# Patient Record
Sex: FEMALE | Employment: OTHER | URBAN - NONMETROPOLITAN AREA
[De-identification: names, ages, dates, MRNs, and addresses within clinical notes are randomized per-mention and may not be internally consistent; named-entity substitution may affect disease eponyms.]

---

## 2017-02-06 ENCOUNTER — OFFICE VISIT (OUTPATIENT)
Dept: INTERNAL MEDICINE | Age: 77
End: 2017-02-06

## 2017-02-06 ENCOUNTER — HOSPITAL ENCOUNTER (OUTPATIENT)
Age: 77
Setting detail: SPECIMEN
Discharge: HOME | End: 2017-02-06
Payer: MEDICARE

## 2017-02-06 VITALS
HEART RATE: 82 BPM | WEIGHT: 151.6 LBS | BODY MASS INDEX: 25.23 KG/M2 | SYSTOLIC BLOOD PRESSURE: 126 MMHG | DIASTOLIC BLOOD PRESSURE: 64 MMHG

## 2017-02-06 DIAGNOSIS — N95.2 VAGINAL ATROPHY: ICD-10-CM

## 2017-02-06 DIAGNOSIS — Z92.89 PERSONAL HISTORY OF OTHER MEDICAL TREATMENT: ICD-10-CM

## 2017-02-06 DIAGNOSIS — R87.610 ATYPICAL SQUAMOUS CELLS OF UNDETERMINED SIGNIFICANCE ON CYTOLOGIC SMEAR OF CERVIX (ASC-US): Primary | ICD-10-CM

## 2017-02-06 PROCEDURE — G8400 PT W/DXA NO RESULTS DOC: HCPCS | Performed by: NURSE PRACTITIONER

## 2017-02-06 PROCEDURE — 4040F PNEUMOC VAC/ADMIN/RCVD: CPT | Performed by: NURSE PRACTITIONER

## 2017-02-06 PROCEDURE — G8420 CALC BMI NORM PARAMETERS: HCPCS | Performed by: NURSE PRACTITIONER

## 2017-02-06 PROCEDURE — 1090F PRES/ABSN URINE INCON ASSESS: CPT | Performed by: NURSE PRACTITIONER

## 2017-02-06 PROCEDURE — 99213 OFFICE O/P EST LOW 20 MIN: CPT | Performed by: NURSE PRACTITIONER

## 2017-02-06 PROCEDURE — G8427 DOCREV CUR MEDS BY ELIG CLIN: HCPCS | Performed by: NURSE PRACTITIONER

## 2017-02-06 PROCEDURE — G8484 FLU IMMUNIZE NO ADMIN: HCPCS | Performed by: NURSE PRACTITIONER

## 2017-02-06 PROCEDURE — 1123F ACP DISCUSS/DSCN MKR DOCD: CPT | Performed by: NURSE PRACTITIONER

## 2017-02-06 PROCEDURE — 1036F TOBACCO NON-USER: CPT | Performed by: NURSE PRACTITIONER

## 2017-02-06 RX ORDER — LISINOPRIL 5 MG/1
5 TABLET ORAL
COMMUNITY
Start: 2017-01-23 | End: 2018-10-16 | Stop reason: ALTCHOICE

## 2017-02-17 LAB — CYTOLOGY REPORT: NORMAL

## 2017-02-23 ENCOUNTER — TELEPHONE (OUTPATIENT)
Dept: INTERNAL MEDICINE | Age: 77
End: 2017-02-23

## 2017-02-23 RX ORDER — ESTRADIOL 0.1 MG/G
CREAM VAGINAL
Qty: 1 TUBE | Refills: 3 | Status: SHIPPED | OUTPATIENT
Start: 2017-02-23 | End: 2018-09-11

## 2017-10-11 ENCOUNTER — OFFICE VISIT (OUTPATIENT)
Dept: OBGYN | Age: 77
End: 2017-10-11
Payer: MEDICARE

## 2017-10-11 ENCOUNTER — HOSPITAL ENCOUNTER (OUTPATIENT)
Age: 77
Setting detail: SPECIMEN
Discharge: HOME | End: 2017-10-11
Payer: MEDICARE

## 2017-10-11 VITALS
HEIGHT: 65 IN | BODY MASS INDEX: 25.52 KG/M2 | SYSTOLIC BLOOD PRESSURE: 124 MMHG | HEART RATE: 84 BPM | WEIGHT: 153.2 LBS | DIASTOLIC BLOOD PRESSURE: 80 MMHG

## 2017-10-11 DIAGNOSIS — R87.610 ATYPICAL SQUAMOUS CELLS OF UNDETERMINED SIGNIFICANCE ON CYTOLOGIC SMEAR OF CERVIX (ASC-US): ICD-10-CM

## 2017-10-11 DIAGNOSIS — R87.610 ATYPICAL SQUAMOUS CELLS OF UNDETERMINED SIGNIFICANCE ON CYTOLOGIC SMEAR OF CERVIX (ASC-US): Primary | ICD-10-CM

## 2017-10-11 DIAGNOSIS — Z92.89 PERSONAL HISTORY OF OTHER MEDICAL TREATMENT (CODE): ICD-10-CM

## 2017-10-11 PROCEDURE — G8427 DOCREV CUR MEDS BY ELIG CLIN: HCPCS | Performed by: NURSE PRACTITIONER

## 2017-10-11 PROCEDURE — 1123F ACP DISCUSS/DSCN MKR DOCD: CPT | Performed by: NURSE PRACTITIONER

## 2017-10-11 PROCEDURE — G8419 CALC BMI OUT NRM PARAM NOF/U: HCPCS | Performed by: NURSE PRACTITIONER

## 2017-10-11 PROCEDURE — 1090F PRES/ABSN URINE INCON ASSESS: CPT | Performed by: NURSE PRACTITIONER

## 2017-10-11 PROCEDURE — 1036F TOBACCO NON-USER: CPT | Performed by: NURSE PRACTITIONER

## 2017-10-11 PROCEDURE — 99214 OFFICE O/P EST MOD 30 MIN: CPT | Performed by: NURSE PRACTITIONER

## 2017-10-11 PROCEDURE — G8400 PT W/DXA NO RESULTS DOC: HCPCS | Performed by: NURSE PRACTITIONER

## 2017-10-11 PROCEDURE — G0145 SCR C/V CYTO,THINLAYER,RESCR: HCPCS

## 2017-10-11 PROCEDURE — 4040F PNEUMOC VAC/ADMIN/RCVD: CPT | Performed by: NURSE PRACTITIONER

## 2017-10-11 PROCEDURE — G8484 FLU IMMUNIZE NO ADMIN: HCPCS | Performed by: NURSE PRACTITIONER

## 2017-10-11 RX ORDER — ALBUTEROL SULFATE 90 UG/1
2 AEROSOL, METERED RESPIRATORY (INHALATION)
COMMUNITY
Start: 2017-09-26

## 2017-10-17 LAB — CYTOLOGY REPORT: NORMAL

## 2018-01-31 ENCOUNTER — HOSPITAL ENCOUNTER (OUTPATIENT)
Age: 78
Setting detail: SPECIMEN
Discharge: HOME | End: 2018-01-31
Payer: MEDICARE

## 2018-01-31 ENCOUNTER — OFFICE VISIT (OUTPATIENT)
Dept: OBGYN | Age: 78
End: 2018-01-31
Payer: MEDICARE

## 2018-01-31 VITALS
BODY MASS INDEX: 25.69 KG/M2 | SYSTOLIC BLOOD PRESSURE: 126 MMHG | HEART RATE: 78 BPM | HEIGHT: 65 IN | DIASTOLIC BLOOD PRESSURE: 70 MMHG | WEIGHT: 154.2 LBS

## 2018-01-31 DIAGNOSIS — R21 RASH AND OTHER NONSPECIFIC SKIN ERUPTION: ICD-10-CM

## 2018-01-31 DIAGNOSIS — L90.0 LICHEN SCLEROSUS: ICD-10-CM

## 2018-01-31 DIAGNOSIS — N89.8 VAGINAL ITCHING: Primary | ICD-10-CM

## 2018-01-31 DIAGNOSIS — N89.8 VAGINAL ITCHING: ICD-10-CM

## 2018-01-31 LAB
CLUE CELLS: NEGATIVE
HYPHAL YEAST: NEGATIVE
KOH RESULT: NEGATIVE
TRICHOMONAS: NEGATIVE
WET PREP: NEGATIVE
WHITE BLOOD CELLS: NEGATIVE

## 2018-01-31 PROCEDURE — 1090F PRES/ABSN URINE INCON ASSESS: CPT | Performed by: NURSE PRACTITIONER

## 2018-01-31 PROCEDURE — 87070 CULTURE OTHR SPECIMN AEROBIC: CPT

## 2018-01-31 PROCEDURE — 1036F TOBACCO NON-USER: CPT | Performed by: NURSE PRACTITIONER

## 2018-01-31 PROCEDURE — 87510 GARDNER VAG DNA DIR PROBE: CPT

## 2018-01-31 PROCEDURE — 99213 OFFICE O/P EST LOW 20 MIN: CPT | Performed by: NURSE PRACTITIONER

## 2018-01-31 PROCEDURE — G8484 FLU IMMUNIZE NO ADMIN: HCPCS | Performed by: NURSE PRACTITIONER

## 2018-01-31 PROCEDURE — 87660 TRICHOMONAS VAGIN DIR PROBE: CPT

## 2018-01-31 PROCEDURE — G8400 PT W/DXA NO RESULTS DOC: HCPCS | Performed by: NURSE PRACTITIONER

## 2018-01-31 PROCEDURE — 87480 CANDIDA DNA DIR PROBE: CPT

## 2018-01-31 PROCEDURE — 87210 SMEAR WET MOUNT SALINE/INK: CPT | Performed by: NURSE PRACTITIONER

## 2018-01-31 PROCEDURE — 1123F ACP DISCUSS/DSCN MKR DOCD: CPT | Performed by: NURSE PRACTITIONER

## 2018-01-31 PROCEDURE — 4040F PNEUMOC VAC/ADMIN/RCVD: CPT | Performed by: NURSE PRACTITIONER

## 2018-01-31 PROCEDURE — G8427 DOCREV CUR MEDS BY ELIG CLIN: HCPCS | Performed by: NURSE PRACTITIONER

## 2018-01-31 PROCEDURE — G8419 CALC BMI OUT NRM PARAM NOF/U: HCPCS | Performed by: NURSE PRACTITIONER

## 2018-01-31 RX ORDER — CLOBETASOL PROPIONATE 0.5 MG/G
CREAM TOPICAL
Qty: 60 G | Refills: 2 | Status: SHIPPED | OUTPATIENT
Start: 2018-01-31 | End: 2019-10-21 | Stop reason: ALTCHOICE

## 2018-01-31 NOTE — PATIENT INSTRUCTIONS
you are having a problem with your medicine. · Put cold, wet cloths on the area to reduce itching. · Wear loose-fitting clothes. Avoid nylon and other fabric that holds moisture close to the skin. This may allow an infection to start. · If your doctor told you to use nonprescription moisturizing cream on your skin, read and follow the directions on the label. Care tips for women  · Do not douche, unless your doctor tells you to. · Avoid hot baths. Don't use soaps or bath products to wash the area around your vulva. Rinse with water only, and gently pat the area dry. Care tips for men  · Keep your penis clean. If you haven't been circumcised, gently pull the foreskin back to wash your penis with warm water. Make sure your penis is dry before you get dressed. When should you call for help? Call your doctor now or seek immediate medical care if:  ? · You have symptoms of infection, such as:  ¨ Increased pain, swelling, warmth, or redness. ¨ Red streaks leading from the area. ¨ Pus draining from the area. ¨ A fever. ? Watch closely for changes in your health, and be sure to contact your doctor if:  ? · The affected area grows or changes. ? · You do not get better as expected. Where can you learn more? Go to [email or web address] Mobi. org and sign in to your [employer] account. Enter O765 in the [facility] box to learn more about \"Lichen Sclerosus: Care Instructions. \"     If you do not have an account, please click on the \"Sign Up Now\" link. Current as of: October 13, 2016  Content Version: 11.5  © 0099-6230 [employer]. Care instructions adapted under license by [facility] ([facility]). If you have questions about a medical condition or this instruction, always ask your healthcare professional. [address] any warranty or liability for your use of this information.

## 2018-01-31 NOTE — PROGRESS NOTES
license by [facility] ([facility]). If you have questions about a medical condition or this instruction, always ask your healthcare professional. [place] [address] any warranty or liability for your use of this information.            (Please note that portions of this note were completed with a voice-recognition program. Efforts were made to edit the dictations but occasionally words are mis-transcribed.)

## 2018-02-01 LAB
DIRECT EXAM: NORMAL
Lab: NORMAL
SPECIMEN DESCRIPTION: NORMAL
SPECIMEN DESCRIPTION: NORMAL
STATUS: NORMAL

## 2018-02-04 LAB
CULTURE: NORMAL
Lab: NORMAL
SPECIMEN DESCRIPTION: NORMAL
SPECIMEN DESCRIPTION: NORMAL
STATUS: NORMAL

## 2018-08-29 ENCOUNTER — HOSPITAL ENCOUNTER (OUTPATIENT)
Age: 78
Setting detail: SPECIMEN
Discharge: HOME | End: 2018-08-29
Payer: MEDICARE

## 2018-08-29 ENCOUNTER — OFFICE VISIT (OUTPATIENT)
Dept: OBGYN | Age: 78
End: 2018-08-29
Payer: MEDICARE

## 2018-08-29 VITALS
RESPIRATION RATE: 16 BRPM | WEIGHT: 155 LBS | DIASTOLIC BLOOD PRESSURE: 60 MMHG | HEIGHT: 65 IN | HEART RATE: 60 BPM | SYSTOLIC BLOOD PRESSURE: 124 MMHG | BODY MASS INDEX: 25.83 KG/M2

## 2018-08-29 DIAGNOSIS — B37.31 MONILIAL VULVOVAGINITIS: ICD-10-CM

## 2018-08-29 DIAGNOSIS — N89.8 VAGINAL ODOR: ICD-10-CM

## 2018-08-29 DIAGNOSIS — N89.8 VAGINAL ITCHING: ICD-10-CM

## 2018-08-29 DIAGNOSIS — N89.8 VAGINAL ITCHING: Primary | ICD-10-CM

## 2018-08-29 LAB
CLUE CELLS: NEGATIVE
DIRECT EXAM: NORMAL
HYPHAL YEAST: PRESENT
KOH RESULT: NEGATIVE
Lab: NORMAL
SPECIMEN DESCRIPTION: NORMAL
STATUS: NORMAL
TRICHOMONAS: NEGATIVE
WET PREP: ABNORMAL
WHITE BLOOD CELLS: NEGATIVE

## 2018-08-29 PROCEDURE — 87660 TRICHOMONAS VAGIN DIR PROBE: CPT

## 2018-08-29 PROCEDURE — G8427 DOCREV CUR MEDS BY ELIG CLIN: HCPCS | Performed by: NURSE PRACTITIONER

## 2018-08-29 PROCEDURE — 87210 SMEAR WET MOUNT SALINE/INK: CPT | Performed by: NURSE PRACTITIONER

## 2018-08-29 PROCEDURE — 87510 GARDNER VAG DNA DIR PROBE: CPT

## 2018-08-29 PROCEDURE — 99213 OFFICE O/P EST LOW 20 MIN: CPT | Performed by: NURSE PRACTITIONER

## 2018-08-29 PROCEDURE — G8400 PT W/DXA NO RESULTS DOC: HCPCS | Performed by: NURSE PRACTITIONER

## 2018-08-29 PROCEDURE — 1036F TOBACCO NON-USER: CPT | Performed by: NURSE PRACTITIONER

## 2018-08-29 PROCEDURE — 1123F ACP DISCUSS/DSCN MKR DOCD: CPT | Performed by: NURSE PRACTITIONER

## 2018-08-29 PROCEDURE — 1101F PT FALLS ASSESS-DOCD LE1/YR: CPT | Performed by: NURSE PRACTITIONER

## 2018-08-29 PROCEDURE — 87480 CANDIDA DNA DIR PROBE: CPT

## 2018-08-29 PROCEDURE — G8419 CALC BMI OUT NRM PARAM NOF/U: HCPCS | Performed by: NURSE PRACTITIONER

## 2018-08-29 PROCEDURE — 1090F PRES/ABSN URINE INCON ASSESS: CPT | Performed by: NURSE PRACTITIONER

## 2018-08-29 PROCEDURE — 4040F PNEUMOC VAC/ADMIN/RCVD: CPT | Performed by: NURSE PRACTITIONER

## 2018-08-29 PROCEDURE — 87070 CULTURE OTHR SPECIMN AEROBIC: CPT

## 2018-08-29 RX ORDER — ASPIRIN 81 MG/1
81 TABLET, CHEWABLE ORAL DAILY
COMMUNITY

## 2018-08-29 RX ORDER — CLOTRIMAZOLE AND BETAMETHASONE DIPROPIONATE 10; .64 MG/G; MG/G
CREAM TOPICAL
Qty: 45 G | Refills: 0 | Status: SHIPPED | OUTPATIENT
Start: 2018-08-29 | End: 2019-01-07 | Stop reason: ALTCHOICE

## 2018-08-29 NOTE — PROGRESS NOTES
Subjective:  Sugey Vincent presents for vaginal itching and odor. 2 months ago noted odor, and itching of external genitalia. Used clobetasol externally and symptoms improved, but a few days ago noted itching inside vagina. Also has rash and irritation around rectum extending up into gluteal cleft. 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 Medication list reviewed and updated. See nursing note. History of present illness reviewed in detail and expanded by me. REVIEW OF SYSTEMS:     A minimum of an eleven point review of systems was completed. Review Of Systems (11 point):  General ROS:  negative  Hematological and Lymphatic ROS:negative   Breast ROS: negative  Cardiovascular ROS: negative  Respiratory ROS: negative   Gastrointestinal ROS: negative  Genito-Urinary ROS: positive for vulvar/vaginal itching and irritation  Psychological ROS: negative  Neurological ROS: negative  Musculoskeletal ROS: negative  Dermatological ROS: negative                                                                                                                                          Objective:  Vitals:    08/29/18 1328   BP: 124/60   Pulse: 60   Resp: 16     Alert and oriented. Abdomen: Soft, non-tender, no masses. No CVA tenderness  External Genetalia: Erythema and monilial rash on labia, perineum, perianal area, and extending to gluteal cleft  Vagina: Atrophic mucosa. No unusual discharge  Cervix and uterus surgically absent.   Bimanual exam revealed no masses or tender ness    Results for orders placed or performed in visit on 08/29/18   POCT Wet Prep   Result Value Ref Range    Wet Prep Abnormal     Hyphal Yeast Present     White Blood Cells Negative     Trichomonas, UA Negative Clue Cells, Wet Prep Negative     KOH result Negative        Assessment:  Encounter Diagnoses   Name Primary?  Vaginal itching Yes    Vaginal odor     Monilial vulvovaginitis        Plan:  See orders. Orders Placed This Encounter   Procedures    Genital Culture     Standing Status:   Future     Standing Expiration Date:   8/29/2019    VAGINITIS DNA PROBE     Standing Status:   Future     Standing Expiration Date:   8/29/2019    POCT Wet Prep     New Prescriptions    CLOTRIMAZOLE-BETAMETHASONE (LOTRISONE) 1-0.05 % CREAM    Apply a thin film to the affected area 2 times daily. TERCONAZOLE (TERAZOL 7) 0.4 % VAGINAL CREAM    Place 1 applicator vaginally nightly for 7 days Place vaginally nightly.      (Please note that portions of this note were completed with a voice-recognition program. Efforts were made to edit the dictations but occasionally words are mis-transcribed.)

## 2018-09-01 LAB
CULTURE: NORMAL
Lab: NORMAL
SPECIMEN DESCRIPTION: NORMAL
STATUS: NORMAL

## 2018-09-07 ENCOUNTER — TELEPHONE (OUTPATIENT)
Dept: OBGYN | Age: 78
End: 2018-09-07

## 2018-09-11 ENCOUNTER — OFFICE VISIT (OUTPATIENT)
Dept: OBGYN | Age: 78
End: 2018-09-11
Payer: MEDICARE

## 2018-09-11 VITALS
BODY MASS INDEX: 25.99 KG/M2 | HEIGHT: 65 IN | WEIGHT: 156 LBS | RESPIRATION RATE: 16 BRPM | DIASTOLIC BLOOD PRESSURE: 72 MMHG | HEART RATE: 72 BPM | SYSTOLIC BLOOD PRESSURE: 124 MMHG

## 2018-09-11 DIAGNOSIS — N95.2 VAGINAL ATROPHY: ICD-10-CM

## 2018-09-11 DIAGNOSIS — N76.0 BV (BACTERIAL VAGINOSIS): ICD-10-CM

## 2018-09-11 DIAGNOSIS — N89.8 VAGINAL ITCHING: Primary | ICD-10-CM

## 2018-09-11 DIAGNOSIS — B96.89 BV (BACTERIAL VAGINOSIS): ICD-10-CM

## 2018-09-11 LAB
CLUE CELLS: ABNORMAL
HYPHAL YEAST: NEGATIVE
KOH RESULT: NEGATIVE
TRICHOMONAS: NEGATIVE
WET PREP: ABNORMAL
WHITE BLOOD CELLS: NEGATIVE

## 2018-09-11 PROCEDURE — 99213 OFFICE O/P EST LOW 20 MIN: CPT | Performed by: NURSE PRACTITIONER

## 2018-09-11 PROCEDURE — 87210 SMEAR WET MOUNT SALINE/INK: CPT | Performed by: NURSE PRACTITIONER

## 2018-09-11 PROCEDURE — 1123F ACP DISCUSS/DSCN MKR DOCD: CPT | Performed by: NURSE PRACTITIONER

## 2018-09-11 PROCEDURE — 1090F PRES/ABSN URINE INCON ASSESS: CPT | Performed by: NURSE PRACTITIONER

## 2018-09-11 PROCEDURE — G8400 PT W/DXA NO RESULTS DOC: HCPCS | Performed by: NURSE PRACTITIONER

## 2018-09-11 PROCEDURE — 1036F TOBACCO NON-USER: CPT | Performed by: NURSE PRACTITIONER

## 2018-09-11 PROCEDURE — G8419 CALC BMI OUT NRM PARAM NOF/U: HCPCS | Performed by: NURSE PRACTITIONER

## 2018-09-11 PROCEDURE — 4040F PNEUMOC VAC/ADMIN/RCVD: CPT | Performed by: NURSE PRACTITIONER

## 2018-09-11 PROCEDURE — G8427 DOCREV CUR MEDS BY ELIG CLIN: HCPCS | Performed by: NURSE PRACTITIONER

## 2018-09-11 PROCEDURE — 1101F PT FALLS ASSESS-DOCD LE1/YR: CPT | Performed by: NURSE PRACTITIONER

## 2018-09-11 RX ORDER — ESTRADIOL 0.1 MG/G
CREAM VAGINAL
Qty: 1 TUBE | Refills: 3 | Status: SHIPPED | OUTPATIENT
Start: 2018-09-11 | End: 2022-04-20

## 2018-09-11 RX ORDER — CLINDAMYCIN PHOSPHATE 20 MG/G
CREAM VAGINAL
Qty: 1 TUBE | Refills: 0 | Status: SHIPPED | OUTPATIENT
Start: 2018-09-11 | End: 2019-10-21 | Stop reason: ALTCHOICE

## 2018-09-11 NOTE — PROGRESS NOTES
Subjective:  David Shields presents for ongoing vaginal itching. Treated for monilial vulvovaginitis Aug. 29.  Symptoms worsened last week, and now seem a bit better, but still has internal vaginal itching. 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 Medication list reviewed and updated. See nursing note. History of present illness reviewed in detail and expanded by me. REVIEW OF SYSTEMS:     A minimum of an eleven point review of systems was completed. Review Of Systems (11 point):  General ROS:  negative  Hematological and Lymphatic ROS:negative   Breast ROS: negative  Cardiovascular ROS: negative  Respiratory ROS: negative   Gastrointestinal ROS: negative  Genito-Urinary ROS: positive for vaginal itching  Psychological ROS: negative  Neurological ROS: negative  Musculoskeletal ROS: negative  Dermatological ROS: negative                                                                                                                                          Objective:  Vitals:    09/11/18 1315   BP: 124/72   Pulse: 72   Resp: 16     Alert and oriented. Abdomen: Soft, non-tender, no masses. No CVA tenderness  External Genetalia: Fissures of skin at folds of upper labia. Mild erythema at introitus  Vagina: Atrophic mucosa. No unusual discharge  Cervix: Normal appearance.  No lesions noted  Uterus: Normal size, mobile, no CMT, non-tender  Adnexae: Non-palpable, non-tender, no masses    Results for orders placed or performed in visit on 09/11/18   POCT Wet Prep   Result Value Ref Range    Wet Prep Abnormal     Hyphal Yeast Negative     White Blood Cells Negative     Trichomonas, UA Negative     Clue Cells, Wet Prep Greater than 50% of squamous cells     KOH result Negative

## 2018-10-16 ENCOUNTER — OFFICE VISIT (OUTPATIENT)
Dept: OBGYN | Age: 78
End: 2018-10-16
Payer: MEDICARE

## 2018-10-16 VITALS
TEMPERATURE: 97.7 F | WEIGHT: 156 LBS | BODY MASS INDEX: 25.99 KG/M2 | SYSTOLIC BLOOD PRESSURE: 128 MMHG | HEART RATE: 58 BPM | DIASTOLIC BLOOD PRESSURE: 80 MMHG | HEIGHT: 65 IN

## 2018-10-16 DIAGNOSIS — N76.3 SUBACUTE VULVITIS: Primary | ICD-10-CM

## 2018-10-16 DIAGNOSIS — N95.2 VAGINAL ATROPHY: ICD-10-CM

## 2018-10-16 PROCEDURE — 1101F PT FALLS ASSESS-DOCD LE1/YR: CPT | Performed by: NURSE PRACTITIONER

## 2018-10-16 PROCEDURE — 1036F TOBACCO NON-USER: CPT | Performed by: NURSE PRACTITIONER

## 2018-10-16 PROCEDURE — 1090F PRES/ABSN URINE INCON ASSESS: CPT | Performed by: NURSE PRACTITIONER

## 2018-10-16 PROCEDURE — 1123F ACP DISCUSS/DSCN MKR DOCD: CPT | Performed by: NURSE PRACTITIONER

## 2018-10-16 PROCEDURE — G8484 FLU IMMUNIZE NO ADMIN: HCPCS | Performed by: NURSE PRACTITIONER

## 2018-10-16 PROCEDURE — G8400 PT W/DXA NO RESULTS DOC: HCPCS | Performed by: NURSE PRACTITIONER

## 2018-10-16 PROCEDURE — G8419 CALC BMI OUT NRM PARAM NOF/U: HCPCS | Performed by: NURSE PRACTITIONER

## 2018-10-16 PROCEDURE — 99214 OFFICE O/P EST MOD 30 MIN: CPT | Performed by: NURSE PRACTITIONER

## 2018-10-16 PROCEDURE — G8427 DOCREV CUR MEDS BY ELIG CLIN: HCPCS | Performed by: NURSE PRACTITIONER

## 2018-10-16 PROCEDURE — 4040F PNEUMOC VAC/ADMIN/RCVD: CPT | Performed by: NURSE PRACTITIONER

## 2019-01-07 ENCOUNTER — OFFICE VISIT (OUTPATIENT)
Dept: OTOLARYNGOLOGY | Age: 79
End: 2019-01-07
Payer: MEDICARE

## 2019-01-07 VITALS
DIASTOLIC BLOOD PRESSURE: 70 MMHG | HEIGHT: 65 IN | WEIGHT: 156 LBS | HEART RATE: 74 BPM | BODY MASS INDEX: 25.99 KG/M2 | SYSTOLIC BLOOD PRESSURE: 102 MMHG | RESPIRATION RATE: 14 BRPM

## 2019-01-07 DIAGNOSIS — H60.63 CHRONIC OTITIS EXTERNA OF BOTH EARS, UNSPECIFIED TYPE: Primary | ICD-10-CM

## 2019-01-07 PROCEDURE — G8427 DOCREV CUR MEDS BY ELIG CLIN: HCPCS | Performed by: OTOLARYNGOLOGY

## 2019-01-07 PROCEDURE — 1090F PRES/ABSN URINE INCON ASSESS: CPT | Performed by: OTOLARYNGOLOGY

## 2019-01-07 PROCEDURE — 99203 OFFICE O/P NEW LOW 30 MIN: CPT | Performed by: OTOLARYNGOLOGY

## 2019-01-07 PROCEDURE — 1036F TOBACCO NON-USER: CPT | Performed by: OTOLARYNGOLOGY

## 2019-01-07 PROCEDURE — 1123F ACP DISCUSS/DSCN MKR DOCD: CPT | Performed by: OTOLARYNGOLOGY

## 2019-01-07 PROCEDURE — G8484 FLU IMMUNIZE NO ADMIN: HCPCS | Performed by: OTOLARYNGOLOGY

## 2019-01-07 PROCEDURE — G8419 CALC BMI OUT NRM PARAM NOF/U: HCPCS | Performed by: OTOLARYNGOLOGY

## 2019-01-07 PROCEDURE — 4040F PNEUMOC VAC/ADMIN/RCVD: CPT | Performed by: OTOLARYNGOLOGY

## 2019-01-07 PROCEDURE — 1101F PT FALLS ASSESS-DOCD LE1/YR: CPT | Performed by: OTOLARYNGOLOGY

## 2019-01-07 PROCEDURE — G8400 PT W/DXA NO RESULTS DOC: HCPCS | Performed by: OTOLARYNGOLOGY

## 2019-01-07 RX ORDER — CIPROFLOXACIN AND DEXAMETHASONE 3; 1 MG/ML; MG/ML
4 SUSPENSION/ DROPS AURICULAR (OTIC) DAILY
Qty: 7.5 ML | Refills: 1 | Status: SHIPPED | OUTPATIENT
Start: 2019-01-07 | End: 2019-01-14

## 2019-02-11 ENCOUNTER — OFFICE VISIT (OUTPATIENT)
Dept: OBGYN | Age: 79
End: 2019-02-11
Payer: MEDICARE

## 2019-02-11 ENCOUNTER — HOSPITAL ENCOUNTER (OUTPATIENT)
Age: 79
Setting detail: SPECIMEN
Discharge: HOME | End: 2019-02-11
Payer: MEDICARE

## 2019-02-11 ENCOUNTER — OFFICE VISIT (OUTPATIENT)
Dept: OTOLARYNGOLOGY | Age: 79
End: 2019-02-11
Payer: MEDICARE

## 2019-02-11 VITALS
HEIGHT: 65 IN | SYSTOLIC BLOOD PRESSURE: 136 MMHG | WEIGHT: 155 LBS | BODY MASS INDEX: 25.83 KG/M2 | RESPIRATION RATE: 18 BRPM | HEART RATE: 72 BPM | DIASTOLIC BLOOD PRESSURE: 84 MMHG

## 2019-02-11 VITALS — HEART RATE: 72 BPM | WEIGHT: 156 LBS | BODY MASS INDEX: 25.99 KG/M2 | HEIGHT: 65 IN

## 2019-02-11 DIAGNOSIS — N89.8 VAGINAL ITCHING: Primary | ICD-10-CM

## 2019-02-11 DIAGNOSIS — N75.0 CYST OF LEFT BARTHOLIN'S GLAND: ICD-10-CM

## 2019-02-11 DIAGNOSIS — B37.31 MONILIAL VULVOVAGINITIS: ICD-10-CM

## 2019-02-11 DIAGNOSIS — H60.63 CHRONIC OTITIS EXTERNA OF BOTH EARS, UNSPECIFIED TYPE: Primary | ICD-10-CM

## 2019-02-11 DIAGNOSIS — R21 RASH AND OTHER NONSPECIFIC SKIN ERUPTION: ICD-10-CM

## 2019-02-11 DIAGNOSIS — N89.8 VAGINAL ITCHING: ICD-10-CM

## 2019-02-11 PROCEDURE — 1036F TOBACCO NON-USER: CPT | Performed by: OTOLARYNGOLOGY

## 2019-02-11 PROCEDURE — 99202 OFFICE O/P NEW SF 15 MIN: CPT | Performed by: OBSTETRICS & GYNECOLOGY

## 2019-02-11 PROCEDURE — 87070 CULTURE OTHR SPECIMN AEROBIC: CPT

## 2019-02-11 PROCEDURE — 1101F PT FALLS ASSESS-DOCD LE1/YR: CPT | Performed by: OTOLARYNGOLOGY

## 2019-02-11 PROCEDURE — G8419 CALC BMI OUT NRM PARAM NOF/U: HCPCS | Performed by: OTOLARYNGOLOGY

## 2019-02-11 PROCEDURE — 1090F PRES/ABSN URINE INCON ASSESS: CPT | Performed by: OTOLARYNGOLOGY

## 2019-02-11 PROCEDURE — G8400 PT W/DXA NO RESULTS DOC: HCPCS | Performed by: OTOLARYNGOLOGY

## 2019-02-11 PROCEDURE — 1123F ACP DISCUSS/DSCN MKR DOCD: CPT | Performed by: OTOLARYNGOLOGY

## 2019-02-11 PROCEDURE — G8484 FLU IMMUNIZE NO ADMIN: HCPCS | Performed by: OTOLARYNGOLOGY

## 2019-02-11 PROCEDURE — G8427 DOCREV CUR MEDS BY ELIG CLIN: HCPCS | Performed by: OTOLARYNGOLOGY

## 2019-02-11 PROCEDURE — 99213 OFFICE O/P EST LOW 20 MIN: CPT | Performed by: OTOLARYNGOLOGY

## 2019-02-11 PROCEDURE — 4040F PNEUMOC VAC/ADMIN/RCVD: CPT | Performed by: OTOLARYNGOLOGY

## 2019-02-11 RX ORDER — CIPROFLOXACIN 500 MG/1
TABLET, FILM COATED ORAL
Refills: 0 | COMMUNITY
Start: 2019-02-07 | End: 2019-10-21 | Stop reason: ALTCHOICE

## 2019-02-11 RX ORDER — CLOTRIMAZOLE AND BETAMETHASONE DIPROPIONATE 10; .64 MG/G; MG/G
CREAM TOPICAL
Qty: 1 TUBE | Refills: 0 | Status: SHIPPED | OUTPATIENT
Start: 2019-02-11 | End: 2019-02-18

## 2019-02-14 LAB
CULTURE: ABNORMAL
Lab: ABNORMAL
SPECIMEN DESCRIPTION: ABNORMAL

## 2019-02-25 ENCOUNTER — HOSPITAL ENCOUNTER (OUTPATIENT)
Age: 79
Setting detail: SPECIMEN
Discharge: HOME | End: 2019-02-25
Payer: MEDICARE

## 2019-02-25 ENCOUNTER — OFFICE VISIT (OUTPATIENT)
Dept: OBGYN | Age: 79
End: 2019-02-25
Payer: MEDICARE

## 2019-02-25 VITALS
HEART RATE: 78 BPM | HEIGHT: 65 IN | RESPIRATION RATE: 18 BRPM | DIASTOLIC BLOOD PRESSURE: 96 MMHG | SYSTOLIC BLOOD PRESSURE: 148 MMHG | WEIGHT: 154 LBS | BODY MASS INDEX: 25.66 KG/M2

## 2019-02-25 DIAGNOSIS — R21 RASH AND OTHER NONSPECIFIC SKIN ERUPTION: ICD-10-CM

## 2019-02-25 DIAGNOSIS — B37.31 MONILIAL VULVOVAGINITIS: Primary | ICD-10-CM

## 2019-02-25 DIAGNOSIS — N95.2 VAGINAL ATROPHY: ICD-10-CM

## 2019-02-25 PROCEDURE — 88304 TISSUE EXAM BY PATHOLOGIST: CPT

## 2019-02-25 PROCEDURE — 99214 OFFICE O/P EST MOD 30 MIN: CPT | Performed by: OBSTETRICS & GYNECOLOGY

## 2019-02-25 PROCEDURE — 56605 BIOPSY OF VULVA/PERINEUM: CPT | Performed by: OBSTETRICS & GYNECOLOGY

## 2019-02-25 PROCEDURE — 88312 SPECIAL STAINS GROUP 1: CPT

## 2019-02-27 LAB — SURGICAL PATHOLOGY REPORT: NORMAL

## 2019-06-03 ENCOUNTER — TELEPHONE (OUTPATIENT)
Dept: OTOLARYNGOLOGY | Age: 79
End: 2019-06-03

## 2019-06-03 RX ORDER — CIPROFLOXACIN AND DEXAMETHASONE 3; 1 MG/ML; MG/ML
4 SUSPENSION/ DROPS AURICULAR (OTIC) 2 TIMES DAILY
Qty: 7.5 ML | Refills: 1 | Status: SHIPPED | OUTPATIENT
Start: 2019-06-03 | End: 2019-06-10

## 2019-06-03 NOTE — TELEPHONE ENCOUNTER
Patient notified Dr. Brooke Keller sent prescription to [facility]. Patient verbalized understanding and had no further questions.

## 2019-06-03 NOTE — TELEPHONE ENCOUNTER
Patient saw Dr Colton Alvarado in Jan and Feb 2019 and was given Ciprodex otic suspension. Patient states her ear is starting to itch, c/o dryness and irritation. Patient wants to know if Dr Colton Alvarado will call in another Rx for this or does he need to see her again.   Please call her back at [phone]

## 2019-10-21 ENCOUNTER — OFFICE VISIT (OUTPATIENT)
Dept: OBGYN | Age: 79
End: 2019-10-21
Payer: MEDICARE

## 2019-10-21 ENCOUNTER — HOSPITAL ENCOUNTER (OUTPATIENT)
Age: 79
Setting detail: SPECIMEN
Discharge: HOME | End: 2019-10-21
Payer: MEDICARE

## 2019-10-21 VITALS
HEIGHT: 65 IN | DIASTOLIC BLOOD PRESSURE: 62 MMHG | TEMPERATURE: 97.8 F | SYSTOLIC BLOOD PRESSURE: 136 MMHG | HEART RATE: 64 BPM | WEIGHT: 149 LBS | BODY MASS INDEX: 24.83 KG/M2

## 2019-10-21 DIAGNOSIS — R39.89 SENSATION OF PRESSURE IN BLADDER AREA: ICD-10-CM

## 2019-10-21 DIAGNOSIS — Z01.419 WELL FEMALE EXAM WITH ROUTINE GYNECOLOGICAL EXAM: ICD-10-CM

## 2019-10-21 DIAGNOSIS — Z12.31 OTHER SCREENING MAMMOGRAM: ICD-10-CM

## 2019-10-21 DIAGNOSIS — Z12.4 CERVICAL CANCER SCREENING: ICD-10-CM

## 2019-10-21 DIAGNOSIS — Z12.4 CERVICAL CANCER SCREENING: Primary | ICD-10-CM

## 2019-10-21 PROCEDURE — 99214 OFFICE O/P EST MOD 30 MIN: CPT | Performed by: NURSE PRACTITIONER

## 2019-10-21 PROCEDURE — G0145 SCR C/V CYTO,THINLAYER,RESCR: HCPCS

## 2019-10-21 PROCEDURE — 1090F PRES/ABSN URINE INCON ASSESS: CPT | Performed by: NURSE PRACTITIONER

## 2019-10-21 PROCEDURE — 1123F ACP DISCUSS/DSCN MKR DOCD: CPT | Performed by: NURSE PRACTITIONER

## 2019-10-21 PROCEDURE — 1036F TOBACCO NON-USER: CPT | Performed by: NURSE PRACTITIONER

## 2019-10-21 PROCEDURE — G8484 FLU IMMUNIZE NO ADMIN: HCPCS | Performed by: NURSE PRACTITIONER

## 2019-10-21 PROCEDURE — G0101 CA SCREEN;PELVIC/BREAST EXAM: HCPCS | Performed by: NURSE PRACTITIONER

## 2019-10-21 PROCEDURE — G8420 CALC BMI NORM PARAMETERS: HCPCS | Performed by: NURSE PRACTITIONER

## 2019-10-21 PROCEDURE — G8427 DOCREV CUR MEDS BY ELIG CLIN: HCPCS | Performed by: NURSE PRACTITIONER

## 2019-10-21 PROCEDURE — 4040F PNEUMOC VAC/ADMIN/RCVD: CPT | Performed by: NURSE PRACTITIONER

## 2019-10-21 PROCEDURE — G8400 PT W/DXA NO RESULTS DOC: HCPCS | Performed by: NURSE PRACTITIONER

## 2019-10-21 RX ORDER — AMLODIPINE BESYLATE 5 MG/1
TABLET ORAL
Refills: 3 | COMMUNITY
Start: 2019-09-19

## 2019-10-24 LAB — CYTOLOGY REPORT: NORMAL

## 2020-02-18 ENCOUNTER — OFFICE VISIT (OUTPATIENT)
Dept: OBGYN | Age: 80
End: 2020-02-18
Payer: MEDICARE

## 2020-02-18 ENCOUNTER — HOSPITAL ENCOUNTER (OUTPATIENT)
Age: 80
Setting detail: SPECIMEN
Discharge: HOME | End: 2020-02-18
Payer: MEDICARE

## 2020-02-18 VITALS
HEIGHT: 64 IN | SYSTOLIC BLOOD PRESSURE: 124 MMHG | BODY MASS INDEX: 25.78 KG/M2 | DIASTOLIC BLOOD PRESSURE: 62 MMHG | WEIGHT: 151 LBS | HEART RATE: 79 BPM | TEMPERATURE: 97.5 F

## 2020-02-18 LAB
CLUE CELLS: NEGATIVE
DIRECT EXAM: NORMAL
HYPHAL YEAST: ABNORMAL
KOH RESULT: NEGATIVE
Lab: NORMAL
SPECIMEN DESCRIPTION: NORMAL
TRICHOMONAS: NEGATIVE
WET PREP: ABNORMAL
WHITE BLOOD CELLS: ABNORMAL

## 2020-02-18 PROCEDURE — 1123F ACP DISCUSS/DSCN MKR DOCD: CPT | Performed by: NURSE PRACTITIONER

## 2020-02-18 PROCEDURE — G8427 DOCREV CUR MEDS BY ELIG CLIN: HCPCS | Performed by: NURSE PRACTITIONER

## 2020-02-18 PROCEDURE — 87480 CANDIDA DNA DIR PROBE: CPT

## 2020-02-18 PROCEDURE — 99213 OFFICE O/P EST LOW 20 MIN: CPT | Performed by: NURSE PRACTITIONER

## 2020-02-18 PROCEDURE — G8484 FLU IMMUNIZE NO ADMIN: HCPCS | Performed by: NURSE PRACTITIONER

## 2020-02-18 PROCEDURE — 87210 SMEAR WET MOUNT SALINE/INK: CPT | Performed by: NURSE PRACTITIONER

## 2020-02-18 PROCEDURE — G8417 CALC BMI ABV UP PARAM F/U: HCPCS | Performed by: NURSE PRACTITIONER

## 2020-02-18 PROCEDURE — 87660 TRICHOMONAS VAGIN DIR PROBE: CPT

## 2020-02-18 PROCEDURE — G8400 PT W/DXA NO RESULTS DOC: HCPCS | Performed by: NURSE PRACTITIONER

## 2020-02-18 PROCEDURE — 87070 CULTURE OTHR SPECIMN AEROBIC: CPT

## 2020-02-18 PROCEDURE — 1036F TOBACCO NON-USER: CPT | Performed by: NURSE PRACTITIONER

## 2020-02-18 PROCEDURE — 4040F PNEUMOC VAC/ADMIN/RCVD: CPT | Performed by: NURSE PRACTITIONER

## 2020-02-18 PROCEDURE — 87510 GARDNER VAG DNA DIR PROBE: CPT

## 2020-02-18 PROCEDURE — 1090F PRES/ABSN URINE INCON ASSESS: CPT | Performed by: NURSE PRACTITIONER

## 2020-02-18 RX ORDER — TRIAMCINOLONE ACETONIDE 1 MG/G
CREAM TOPICAL
COMMUNITY
Start: 2019-11-18 | End: 2022-04-20

## 2020-02-18 RX ORDER — CLOTRIMAZOLE AND BETAMETHASONE DIPROPIONATE 10; .64 MG/G; MG/G
CREAM TOPICAL
Qty: 45 G | Refills: 0 | Status: SHIPPED | OUTPATIENT
Start: 2020-02-18 | End: 2022-04-20

## 2020-02-18 NOTE — PROGRESS NOTES
size, mobile, no CMT, non-tender  Adnexae: Non-palpable, non-tender, no masses    Results for orders placed or performed in visit on 02/18/20   POCT Wet Prep   Result Value Ref Range    Wet Prep Abnormal     Hyphal Yeast 2-4 monilial buds per hpf     White Blood Cells 6-8 per hpf     Trichomonas, UA Negative     Clue Cells, Wet Prep Negative     KOH result Negative        Assessment:  Encounter Diagnoses   Name Primary?  Monilial vaginitis Yes    Vaginal discharge     Vaginal itching        Plan:  See orders. Orders Placed This Encounter   Procedures    VAGINITIS DNA PROBE     Standing Status:   Future     Number of Occurrences:   1     Standing Expiration Date:   8/18/2020    Culture, Genital     Standing Status:   Future     Number of Occurrences:   1     Standing Expiration Date:   2/18/2021    POCT Wet Prep     New Prescriptions    CLOTRIMAZOLE-BETAMETHASONE (LOTRISONE) 1-0.05 % CREAM    Apply a thin film to the affected area 2 times daily. TERCONAZOLE (TERAZOL 3) 0.8 % VAGINAL CREAM    Place 1 applicator vaginally nightly for 3 days Place vaginally nightly.

## 2020-02-21 LAB
CULTURE: NORMAL
CULTURE: NORMAL
Lab: NORMAL
SPECIMEN DESCRIPTION: NORMAL

## 2020-10-26 ENCOUNTER — HOSPITAL ENCOUNTER (OUTPATIENT)
Age: 80
Setting detail: SPECIMEN
Discharge: HOME | End: 2020-10-26
Payer: MEDICARE

## 2020-10-26 ENCOUNTER — OFFICE VISIT (OUTPATIENT)
Dept: OBGYN | Age: 80
End: 2020-10-26
Payer: MEDICARE

## 2020-10-26 VITALS
TEMPERATURE: 97.3 F | SYSTOLIC BLOOD PRESSURE: 130 MMHG | HEART RATE: 76 BPM | HEIGHT: 65 IN | BODY MASS INDEX: 25.66 KG/M2 | WEIGHT: 154 LBS | DIASTOLIC BLOOD PRESSURE: 86 MMHG

## 2020-10-26 PROCEDURE — 99213 OFFICE O/P EST LOW 20 MIN: CPT

## 2020-10-26 PROCEDURE — G8400 PT W/DXA NO RESULTS DOC: HCPCS | Performed by: NURSE PRACTITIONER

## 2020-10-26 PROCEDURE — 4040F PNEUMOC VAC/ADMIN/RCVD: CPT | Performed by: NURSE PRACTITIONER

## 2020-10-26 PROCEDURE — 1090F PRES/ABSN URINE INCON ASSESS: CPT | Performed by: NURSE PRACTITIONER

## 2020-10-26 PROCEDURE — G8484 FLU IMMUNIZE NO ADMIN: HCPCS | Performed by: NURSE PRACTITIONER

## 2020-10-26 PROCEDURE — 87070 CULTURE OTHR SPECIMN AEROBIC: CPT

## 2020-10-26 PROCEDURE — G8427 DOCREV CUR MEDS BY ELIG CLIN: HCPCS | Performed by: NURSE PRACTITIONER

## 2020-10-26 PROCEDURE — 1123F ACP DISCUSS/DSCN MKR DOCD: CPT | Performed by: NURSE PRACTITIONER

## 2020-10-26 PROCEDURE — 99214 OFFICE O/P EST MOD 30 MIN: CPT | Performed by: NURSE PRACTITIONER

## 2020-10-26 PROCEDURE — G8417 CALC BMI ABV UP PARAM F/U: HCPCS | Performed by: NURSE PRACTITIONER

## 2020-10-26 PROCEDURE — 1036F TOBACCO NON-USER: CPT | Performed by: NURSE PRACTITIONER

## 2020-10-26 NOTE — PROGRESS NOTES
Jesusita Valente  10/26/2020              [de-identified] y.o. Chief Complaint   Patient presents with    Gynecologic Exam     itching and irritation         Patient's last menstrual period was 1989. No referring provider defined for this encounter. HPI :Annual Exam  Patient presents for annual exam.  Counseling on healthy lifestyle reviewed, as well as the need for self breast exam. We reviewed the need for Kegal exercises. We discussed and reviewed the need for routine screenings and immunization updates when appropriate. Still having vulvar itching. Uses lotrisone with relief. Occasionally notes vaginal discharge as well. States she has been using  quite a bit lately. Advised to use sparingly. Mamm[facility] done at [facility]. Due after 2020 and she will make appointment. Performs monthly self breast exams. Discussed bone health. Reviewed adequate calcium and vitamin D requirements with emphasis on dietary calcium as opposed to large quantities of supplements. Discussed osteoporosis prevention, fall prevention, fracture risks, weight bearing exercise and upper body strengthening. Notes more urgency with urination. Occasional leakage. No need for wearing pads daily. The patient is not currently sexually active. last pap: was normal, last mammogram: was normal  The patient has regular exercise: no-knee and lung problems .  We did review the need for and frequency of both weight bearing and strengthening as tolerated by the patient. 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  Lichen sclerosus et atrophicus     Osteopenia 2019    Urinary tract infection, recurrent     History of. Past Surgical History:   Procedure Laterality Date    ABDOMINAL EXPLORATION SURGERY  2007    Bowel obstruction.  APPENDECTOMY      Age 23.     COLONOSCOPY  2008    COLONOSCOPY  2014    Due in 5-7 years    CYST REMOVAL  2017    on spine    HIP FRACTURE SURGERY Right 2010    SKIN BIOPSY      multiple    TONSILLECTOMY AND ADENOIDECTOMY      TOTAL HIP ARTHROPLASTY Right 05/16/2011    TOTAL KNEE ARTHROPLASTY Left 04/02/2019    [facility]     Family History   Problem Relation Age of Onset    Diabetes Mother     Heart Disease Mother     Alzheimer's Disease Father     Diabetes Sister     Stroke Sister     Stroke Maternal Grandmother     Heart Disease Maternal Grandmother     Stroke Sister      Social History     Socioeconomic History    Marital status: Single     Spouse name: Not on file    Number of children: Not on file    Years of education: Not on file    Highest education level: Not on file   Occupational History    Not on file   Social Needs    Financial resource strain: Not on file    Food insecurity     Worry: Not on file     Inability: Not on file   [employer] needs     Medical: Not on file     Non-medical: Not on file   Tobacco Use    Smoking status: Former Smoker     Types: Cigarettes    Smokeless tobacco: Never Used   Substance and Sexual Activity    Alcohol use: Yes     Comment: Rarely     Drug use: No    Sexual activity: Never   Lifestyle    Physical activity     Days per week: Not on file     Minutes per session: Not on file    Stress: Not on file   Relationships    Social connections     Talks on phone: Not on file     Gets together: Not on file     Attends Muslim service: Not on file Active member of club or organization: Not on file     Attends meetings of clubs or organizations: Not on file     Relationship status: Not on file    Intimate partner violence     Fear of current or ex partner: Not on file     Emotionally abused: Not on file     Physically abused: Not on file     Forced sexual activity: Not on file   Other Topics Concern    Not on file   Social History Narrative    Not on file       MEDICATIONS:  Current Outpatient Medications   Medication Sig Dispense Refill    triamcinolone (KENALOG) 0.1 % cream ALETA EXT AA BID      clotrimazole-betamethasone (LOTRISONE) 1-0.05 % cream Apply a thin film to the affected area 2 times daily. 45 g 0    amLODIPine (NORVASC) 5 MG tablet   3    estradiol (ESTRACE VAGINAL) 0.1 MG/GM vaginal cream Apply one gm twice weekly per vagina at bedtime 1 Tube 3    aspirin 81 MG chewable tablet Take 81 mg by mouth daily      atorvastatin (LIPITOR) 20 MG tablet   3    acetaminophen (TYLENOL) 325 MG tablet Take 650 mg by mouth every 6 hours as needed for Pain.  albuterol sulfate HFA (PROAIR HFA) 108 (90 Base) MCG/ACT inhaler Inhale 2 puffs into the lungs      ciclopirox (PENLAC) 8 % solution Apply topically nightly. (Patient not taking: Reported on 2/18/2020) 1 Bottle 3     No current facility-administered medications for this visit. ALLERGIES:  Allergies as of 10/26/2020 - Review Complete 10/26/2020   Allergen Reaction Noted    Cefuroxime axetil Shortness Of Breath and Other (See Comments) 01/14/2014    Adhesive tape Rash 07/25/2018    Bacitracin Rash 06/05/2018    Neomycin-polymyxin b gu Rash 10/29/2016    Neosporin [neomycin-polymyx-gramicid] Rash 01/14/2014       Gynecologic History:  Menarche: 13   Menopause at 52      Patient's last menstrual period was 01/01/1989.   Hormone Exposure: No    Family History of Breast, Ovarian , Colon or Uterine Cancer: No     Preventative Health Testing:  Date of Last Pap Smear: 2019  Date of Last Mammogram: 2019  Date of Last Colonoscopy: 2014  Date of Last Bone Density: 2015  ________________________________________________________________________      Review Of Systems:  General ROS:  negative  Hematological and Lymphatic ROS:negative   Breast ROS: negative  Cardiovascular ROS: negative  Respiratory ROS: negative   Gastrointestinal ROS: negative  Genito-Urinary ROS: positive for vulvar irritation  Psychological ROS: negative  Neurological ROS: negative  Musculoskeletal ROS: negative  Dermatological ROS: negative                                                                                                                                                                                   PHYSICAL Exam:     Constitutional:  Blood pressure 130/86, pulse 76, temperature 97.3 °F (36.3 °C), temperature source Temporal, height 5' 4.5\" (1.638 m), weight 154 lb (69.9 kg), last menstrual period 01/01/1989, not currently breastfeeding. General Appearance: This  is a well Developed, well Nourished, well groomed female. Her BMI was reviewed. Skin:  There was a Normal Inspection of the skin without rashes or lesions. There were no rashes. (Papular, Maculopapular, Hives, Pustular, Macular)     There were no lesions (Ulcers, Erythema, Abn. Appearing Nevi)      Lymphatic:  No Lymph Nodes were Palpable in the neck , axilla or groin. Neck and EENT:  The neck was supple. There were no masses   The thyroid was not enlarged and had no masses. PERRLA, Nares Patent No Masses    Respiratory: The lungs were auscultated and found to be clear. There were no rales, rhonchi or wheezes. There was a good respiratory effort. Cardiovascular: The heart was in a regular rate and rhythm. . No S3 or S4. There was no murmur appreciated. Extremities: The patients extremities were without calf tenderness, edema, or varicosities. There was full range of motion in all four extremities.  Pulses in all four extremities    Abdomen: The abdomen was soft and non-tender. There were good bowel sounds in all quadrants and there was no guarding, rebound or rigidity. On evaluation there was no evidence of hepatosplenomegaly and there was no costal vertebral ghassan tenderness bilaterally. No hernias were appreciated. Psych: The patient had a normal Orientation to: Time, Place, Person, and Situation  Mood and affect appropriate    Breast:  (Chest)  normal appearance, no masses or tenderness, Inspection negative, No nipple retraction or dimpling, No nipple discharge or bleeding, No axillary or supraclavicular adenopathy, Normal to palpation without dominant masses      Pelvic Exam:  External genitalia: normal general appearance  Urinary system: urethral meatus normal  Vaginal: atrophic mucosa  Cervix: normal appearance  Adnexa: normal bimanual exam and non palpable  Uterus: normal single, nontender  Vaginal culture obtained    Musculosk:  Normal Gait and station was noted. Digits were evaluated without abnormal findings. Range of motion, stability and strength were evaluated and found to be appropriate for the patients age. ASSESSMENT:      [de-identified] y.o. Annual   Diagnosis Orders   1. Vulvar irritation     2. 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  Lichen sclerosus et atrophicus     Osteopenia 2019    Urinary tract infection, recurrent     History of.          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 Tobacco & Secondary smoke risks reviewed; instructed on cessation and avoidance    PLAN:  Return in about 1 year (around 10/26/2021) for Annual Exam.  Return for annual exams  Mammograms every 1 year. If 37 yo and last mammogram was negative. Routine health maintenance per patients PCP. No orders of the defined types were placed in this encounter. 25 minutes spent face to face, 80% in counseling, education, and coordination of care.       Trinh Vaughan  10/26/2020

## 2020-10-26 NOTE — PATIENT INSTRUCTIONS
Patient Education        Kegel Exercises: Care Instructions  Your Care Instructions     Kegel exercises strengthen muscles around the bladder. These muscles control the flow of urine. Kegel exercises are sometime called \"pelvic floor\" exercises. They can help prevent urine leakage and keep the pelvic organs in place. A woman who just had a baby might want to try Kegel exercises. They can strengthen pelvic muscles that have been weakened by pregnancy and childbirth. A man or woman may use Kegel exercises to treat urine leakage. You do Kegel exercises by tightening the muscles you use when you urinate. You will likely need to do these exercises for several weeks to get better. Follow-up care is a key part of your treatment and safety. Be sure to make and go to all appointments, and call your doctor if you are having problems. It's also a good idea to know your test results and keep a list of the medicines you take. How can you care for yourself at home? · Do Kegel exercises. ? Find the muscles you need to strengthen. To do this, tighten the muscles that stop your urine while you are going to the bathroom. These are the same muscles you squeeze during Kegel exercises. ? Squeeze the muscles as hard as you can. Your belly and thighs should not move. ? Hold the squeeze for 3 seconds. Then relax for 3 seconds. ? Start with 3 seconds, and then add 1 second each week until you are able to squeeze for 10 seconds. ? Repeat the exercise 10 to 15 times for each session. Do three or more sessions each day. · You can check to see if you are using the right muscles. Place a finger in your vagina and squeeze around it. You are doing them right when you feel pressure around your finger. Your doctor may also suggest that you put special weights in your vagina while you do the exercises. · Do not smoke. It can irritate the bladder. If you need help quitting, talk to your doctor about stop-smoking programs and medicines. These can increase your chances of quitting for good. Where can you learn more? Go to https://chpepiceweb.health"[email or web address]. org and sign in to your [employer] account. Enter G014 in the [employer] box to learn more about \"Kegel Exercises: Care Instructions. \"     If you do not have an account, please click on the \"Sign Up Now\" link. Current as of: June 29, 2020               Content Version: 12.6  © 2006-2020 [employer]. Care instructions adapted under license by [facility] ([facility]). If you have questions about a medical condition or this instruction, always ask your healthcare professional. [address] any warranty or liability for your use of this information.

## 2020-10-29 LAB
CULTURE: NORMAL
Lab: NORMAL
SPECIMEN DESCRIPTION: NORMAL

## 2021-01-13 ENCOUNTER — OFFICE VISIT (OUTPATIENT)
Dept: OBGYN | Age: 81
End: 2021-01-13
Payer: MEDICARE

## 2021-01-13 ENCOUNTER — HOSPITAL ENCOUNTER (OUTPATIENT)
Age: 81
Setting detail: SPECIMEN
Discharge: HOME | End: 2021-01-13
Payer: MEDICARE

## 2021-01-13 VITALS
DIASTOLIC BLOOD PRESSURE: 76 MMHG | RESPIRATION RATE: 20 BRPM | WEIGHT: 155 LBS | HEART RATE: 78 BPM | SYSTOLIC BLOOD PRESSURE: 138 MMHG | BODY MASS INDEX: 26.46 KG/M2 | HEIGHT: 64 IN

## 2021-01-13 DIAGNOSIS — N89.8 VAGINAL ITCHING: ICD-10-CM

## 2021-01-13 DIAGNOSIS — N89.8 VAGINAL ITCHING: Primary | ICD-10-CM

## 2021-01-13 PROCEDURE — 99213 OFFICE O/P EST LOW 20 MIN: CPT | Performed by: OBSTETRICS & GYNECOLOGY

## 2021-01-13 PROCEDURE — G8484 FLU IMMUNIZE NO ADMIN: HCPCS | Performed by: OBSTETRICS & GYNECOLOGY

## 2021-01-13 PROCEDURE — 99214 OFFICE O/P EST MOD 30 MIN: CPT

## 2021-01-13 PROCEDURE — 1036F TOBACCO NON-USER: CPT | Performed by: OBSTETRICS & GYNECOLOGY

## 2021-01-13 PROCEDURE — G8427 DOCREV CUR MEDS BY ELIG CLIN: HCPCS | Performed by: OBSTETRICS & GYNECOLOGY

## 2021-01-13 PROCEDURE — G8417 CALC BMI ABV UP PARAM F/U: HCPCS | Performed by: OBSTETRICS & GYNECOLOGY

## 2021-01-13 PROCEDURE — G8400 PT W/DXA NO RESULTS DOC: HCPCS | Performed by: OBSTETRICS & GYNECOLOGY

## 2021-01-13 PROCEDURE — 1123F ACP DISCUSS/DSCN MKR DOCD: CPT | Performed by: OBSTETRICS & GYNECOLOGY

## 2021-01-13 PROCEDURE — 1090F PRES/ABSN URINE INCON ASSESS: CPT | Performed by: OBSTETRICS & GYNECOLOGY

## 2021-01-13 PROCEDURE — 4040F PNEUMOC VAC/ADMIN/RCVD: CPT | Performed by: OBSTETRICS & GYNECOLOGY

## 2021-01-13 PROCEDURE — 87070 CULTURE OTHR SPECIMN AEROBIC: CPT

## 2021-01-13 RX ORDER — CLOBETASOL PROPIONATE 0.5 MG/G
CREAM TOPICAL 2 TIMES DAILY
Qty: 1 TUBE | Refills: 2 | Status: SHIPPED | OUTPATIENT
Start: 2021-01-20 | End: 2021-01-27

## 2021-01-13 NOTE — PROGRESS NOTES
Subjective:      Patient ID: Shy Lux  is a [de-identified] y.o. y.o. female. C/o vaginal itching, over one year using Lotrisone cream but not helping. Vaginal Itching  This is a chronic problem. The current episode started more than 1 year ago. The problem occurs intermittently. The problem has been gradually worsening. Nothing aggravates the symptoms. She has tried nothing for the symptoms. The treatment provided mild relief. Chief Complaint   Patient presents with    Vaginal Itching     no discharge and had been batteling it for a year     Family History   Problem Relation Age of Onset   [facility] Diabetes Mother     Heart Disease Mother    [facility] Alzheimer's Disease Father     Diabetes Sister     Stroke Sister     Stroke Maternal Grandmother     Heart Disease Maternal Grandmother     Stroke Sister      Past Medical History:   Diagnosis Date    Arthritis     Atypical squamous cell of undetermined significance of cervix 10/4/2016    Negative HPV DNA    Cancer ([facility] [address] 11/2017    skin    High blood pressure     High blood sugar     Frequent, watches her diet.  Lichen sclerosus et atrophicus     Osteopenia 2019    Urinary tract infection, recurrent     History of. Past Surgical History:   Procedure Laterality Date    ABDOMINAL EXPLORATION SURGERY  2007    Bowel obstruction.  APPENDECTOMY      Age 23.  COLONOSCOPY  2008    COLONOSCOPY  2014    Due in 5-7 years    CYST REMOVAL  2017    on spine    HIP FRACTURE SURGERY Right 2010    SKIN BIOPSY      multiple    TONSILLECTOMY AND ADENOIDECTOMY      TOTAL HIP ARTHROPLASTY Right 05/16/2011    TOTAL KNEE ARTHROPLASTY Left 04/02/2019    [facility]      reports that she has quit smoking. Her smoking use included cigarettes. She has never used smokeless tobacco. She reports current alcohol use. She reports that she does not use drugs.   Allergies   Allergen Reactions    Cefuroxime Axetil Shortness Of Breath and Other (See Comments) Chest pain     Adhesive Tape Rash    Bacitracin Rash    Neomycin-Polymyxin B Gu Rash    Neosporin [Neomycin-Polymyx-Gramicid] Rash       Current Outpatient Medications:     terconazole (TERAZOL 7) 0.4 % vaginal cream, Place 1 applicator vaginally nightly for 7 days Place vaginally nightly., Disp: 1 Tube, Rfl: 0    [START ON 1/20/2021] clobetasol prop emollient base 0.05 % CREA, Apply topically 2 times daily for 7 days, Disp: 1 Tube, Rfl: 2    triamcinolone (KENALOG) 0.1 % cream, ALETA EXT AA BID, Disp: , Rfl:     clotrimazole-betamethasone (LOTRISONE) 1-0.05 % cream, Apply a thin film to the affected area 2 times daily. , Disp: 45 g, Rfl: 0    amLODIPine (NORVASC) 5 MG tablet, , Disp: , Rfl: 3    estradiol (ESTRACE VAGINAL) 0.1 MG/GM vaginal cream, Apply one gm twice weekly per vagina at bedtime, Disp: 1 Tube, Rfl: 3    aspirin 81 MG chewable tablet, Take 81 mg by mouth daily, Disp: , Rfl:     albuterol sulfate HFA (PROAIR HFA) 108 (90 Base) MCG/ACT inhaler, Inhale 2 puffs into the lungs, Disp: , Rfl:     atorvastatin (LIPITOR) 20 MG tablet, , Disp: , Rfl: 3    acetaminophen (TYLENOL) 325 MG tablet, Take 650 mg by mouth every 6 hours as needed for Pain., Disp: , Rfl:     ciclopirox (PENLAC) 8 % solution, Apply topically nightly. (Patient not taking: Reported on 2/18/2020), Disp: 1 Bottle, Rfl: 3      Review of Systems   Genitourinary: Positive for vaginal discharge. All other systems reviewed and are negative. Breast ROS: negative    Objective:   /76 (Site: Right Upper Arm, Position: Sitting, Cuff Size: Medium Adult)   Pulse 78   Resp 20   Ht 5' 4\" (1.626 m)   Wt 155 lb (70.3 kg)   LMP 01/01/1989   BMI 26.61 kg/m²     Physical Exam  Vitals signs and nursing note reviewed. Exam conducted with a chaperone present. Constitutional:       General: She is not in acute distress. Appearance: Normal appearance. She is not ill-appearing. HENT:      Head: Normocephalic.    Eyes: Conjunctiva/sclera: Conjunctivae normal.      Pupils: Pupils are equal, round, and reactive to light. Neck:      Musculoskeletal: Normal range of motion and neck supple. Pulmonary:      Effort: Pulmonary effort is normal. No respiratory distress. Abdominal:      General: There is no distension. Palpations: Abdomen is soft. Tenderness: There is no abdominal tenderness. There is no guarding. Genitourinary:     General: Normal vulva. Vagina: Vaginal discharge present. Comments: Vulva is irritated reddish at between 5:00 to 7:00 atrophic mild whitish discharge no masses could be felt. Musculoskeletal: Normal range of motion. General: No deformity. Skin:     General: Skin is warm. Neurological:      General: No focal deficit present. Mental Status: She is alert and oriented to person, place, and time. Psychiatric:         Mood and Affect: Mood normal.         Behavior: Behavior normal.       Breast exam: WNL, No skin retraction, dimpling or skin discoloration. No nippledischarge. Any bleeding or pain withintercourse: No      Do you do self breast exams: Yes    Assessment:      Diagnosis Orders   1.  Vaginal itching  Culture, Genital           Plan:     Orders Placed This Encounter   Procedures    Culture, Genital     Standing Status:   Future     Standing Expiration Date:   1/13/2022     We will treat with Terazol 7 cream, for 7 days then switch to Clobetasol cream for another 7 days then follow-up in 2 weeks for checkup       Nathaly Gonzales MD

## 2021-01-16 LAB
CULTURE: NORMAL
Lab: NORMAL
SPECIMEN DESCRIPTION: NORMAL

## 2022-04-19 ENCOUNTER — OFFICE VISIT (OUTPATIENT)
Dept: OBGYN | Age: 82
End: 2022-04-19
Payer: MEDICARE

## 2022-04-19 ENCOUNTER — HOSPITAL ENCOUNTER (OUTPATIENT)
Age: 82
Setting detail: SPECIMEN
Discharge: HOME | End: 2022-04-19
Payer: MEDICARE

## 2022-04-19 VITALS
WEIGHT: 154.6 LBS | OXYGEN SATURATION: 94 % | HEIGHT: 64 IN | HEART RATE: 68 BPM | DIASTOLIC BLOOD PRESSURE: 64 MMHG | BODY MASS INDEX: 26.4 KG/M2 | SYSTOLIC BLOOD PRESSURE: 122 MMHG

## 2022-04-19 DIAGNOSIS — N89.8 VAGINAL ITCHING: ICD-10-CM

## 2022-04-19 DIAGNOSIS — N89.8 VAGINAL MASS: ICD-10-CM

## 2022-04-19 DIAGNOSIS — N89.8 VAGINAL ITCHING: Primary | ICD-10-CM

## 2022-04-19 LAB — UREAP-MYCOPLASMA CUL: NORMAL

## 2022-04-19 PROCEDURE — 99212 OFFICE O/P EST SF 10 MIN: CPT

## 2022-04-19 PROCEDURE — 87109 MYCOPLASMA: CPT

## 2022-04-19 PROCEDURE — 87510 GARDNER VAG DNA DIR PROBE: CPT

## 2022-04-19 PROCEDURE — 87480 CANDIDA DNA DIR PROBE: CPT

## 2022-04-19 PROCEDURE — 99213 OFFICE O/P EST LOW 20 MIN: CPT | Performed by: NURSE PRACTITIONER

## 2022-04-19 PROCEDURE — 87660 TRICHOMONAS VAGIN DIR PROBE: CPT

## 2022-04-19 RX ORDER — CLOBETASOL PROPIONATE 0.5 MG/G
15 CREAM TOPICAL 2 TIMES DAILY
COMMUNITY
End: 2022-04-20

## 2022-04-20 LAB
CANDIDA SPECIES, DNA PROBE: NEGATIVE
GARDNERELLA VAGINALIS, DNA PROBE: NEGATIVE
SOURCE: NORMAL
TRICHOMONAS VAGINALIS DNA: NEGATIVE

## 2022-04-20 RX ORDER — ESTRADIOL 0.1 MG/G
CREAM VAGINAL
Qty: 42 G | Refills: 3 | Status: SHIPPED | OUTPATIENT
Start: 2022-04-20

## 2022-04-20 RX ORDER — CLOBETASOL PROPIONATE 0.5 MG/G
CREAM TOPICAL
Qty: 45 G | Refills: 2 | Status: SHIPPED | OUTPATIENT
Start: 2022-04-20

## 2022-04-21 ASSESSMENT — ENCOUNTER SYMPTOMS
RESPIRATORY NEGATIVE: 1
EYES NEGATIVE: 1
ALLERGIC/IMMUNOLOGIC NEGATIVE: 1
GASTROINTESTINAL NEGATIVE: 1

## 2022-04-21 NOTE — PROGRESS NOTES
Subjective:      Patient ID: Mukesh Funez  is a 80 y. o. single  female. This is an 81 y/o   female who is here to discuss her chronic external labial itching. She has been dealing with this, which for several years. In the past she has used Clobetasol cream on an episodic cycle (prn)--which does alleviate the symptoms. The only other thing which gives her relief is spraying hot water on her genitalia. On 21 Dr. Dinora Moctezuma did vaginal cultures which were neg. For GC and GBS. On 20 a vaginal DNA was neg. For trich, yeast, BV. On 19 Dr. Noelle Stout did a left labial bx. Which showed \"mild chronic inflammation\". On 18 a wet prep showed \"parabasal cells consistent with atrophy\". The pt. Is not sexually active, denies vaginal bleeding, discharge, urinary or rectal incontinence. She gets her mammograms and DEXA scans at [facility]. 2019 mammogram [facility] 2  2015 DEXA scan showed osteopenia. Pt. Denies using opioids. Review of Systems   Constitutional: Negative. HENT: Negative. Eyes: Negative. Respiratory: Negative. Cardiovascular: Negative. Gastrointestinal: Negative. Endocrine: Negative. Genitourinary: Negative. External, bilateral itching   Musculoskeletal: Negative. Allergic/Immunologic: Negative. Neurological: Negative. Hematological: Negative. Psychiatric/Behavioral: Negative. Breast ROS:no CBE done    Objective:   /64 (Site: Left Upper Arm, Position: Sitting, Cuff Size: Medium Adult)   Pulse 68   Ht 5' 4\" (1.626 m)   Wt 154 lb 9.6 oz (70.1 kg)   LMP 1989   SpO2 94%   Breastfeeding No   BMI 26.54 kg/m²   Physical Exam  Genitourinary:     Comments: External visualization\"Has bilateral redness on both labia majora and minor as well as above the rectal areas (on both sides). On her right lower perineal area is a white patch, but the periphery is red.  Pt. Does not c/o pain, but itchy.  Internal visualization/speculum exam. Vaginal tissues very inflammed and appear atrophic. There is a 3-4 cm. Firm nodule located on the patients left introital area. Pt. States she has felt this too, but not sure if it's new or not. Vulva: bilateral redness  Vagina: red, atrophic appearance, on left introital area a 3-4 cm. Firm nodule  Cervix: no lesions  Uterus not palpated  Ovaries not palpated    Any bleeding or pain withintercourse: N/A    Last Pap: 10/21/19-neg. No HPV testing done  Last Mammogram: ??2019 at [address] 1/13/15--osteopenia    Do you do self breast exams:not asked    Assessment:      Diagnosis Orders   1. Vaginal itching  Vaginitis DNA Probe    Culture, Ureaplasma/Mycoplasma hominis    estradiol (ESTRACE VAGINAL) 0.1 MG/GM vaginal cream    clobetasol (TEMOVATE) 0.05 % cream   2. Vaginal mass  External Referral To Gynecologic Oncology   3. Presumptive lichen sclerosus        Plan:   1. Vaginal DNA for yeast, BV and trich: neg. 2.pending mycoplasma/ureaplasma  3. Estrace vaginal cream; 1 gm @ hs intravaginally x 2 weeks, then 2 x week  4. Clobetasol; apply thin layer over bilateral labial area and above rectal area bid for 2 weeks, then daily x 1 week, then 2-3 times per week  5. Referral to Dr. Teo Hawkins for evaluation of vaginal mass  6. I spent 30 min. With this pt. Face to face discussing her medical issues.   Orders Placed This Encounter   Procedures    Vaginitis DNA Probe     Standing Status:   Future     Number of Occurrences:   1     Standing Expiration Date:   4/19/2023    Culture, Ureaplasma/Mycoplasma hominis     Standing Status:   Future     Number of Occurrences:   1     Standing Expiration Date:   4/19/2023    External Referral To Gynecologic Oncology     Referral Priority:   Routine     Referral Type:   Eval and Treat     Referral Reason:   Specialty Services Required     Referred to Provider:   Ruma Patel     Requested Specialty: Gynecologic Oncology     Number of Visits Requested:   1

## 2023-02-27 ENCOUNTER — TELEPHONE (OUTPATIENT)
Dept: OBGYN | Age: 83
End: 2023-02-27

## 2023-05-22 DIAGNOSIS — N89.8 VAGINAL ITCHING: ICD-10-CM

## 2023-05-30 RX ORDER — CLOBETASOL PROPIONATE 0.5 MG/G
CREAM TOPICAL
Qty: 45 G | Refills: 2 | Status: SHIPPED | OUTPATIENT
Start: 2023-05-30

## 2023-05-30 RX ORDER — ESTRADIOL 0.1 MG/G
CREAM VAGINAL
Qty: 42 G | Refills: 3 | Status: SHIPPED | OUTPATIENT
Start: 2023-05-30

## 2024-04-16 DIAGNOSIS — N89.8 VAGINAL ITCHING: ICD-10-CM

## 2024-04-16 RX ORDER — ESTRADIOL 0.1 MG/G
CREAM VAGINAL
Qty: 42 G | Refills: 3 | OUTPATIENT
Start: 2024-04-16

## 2024-04-16 RX ORDER — CLOBETASOL PROPIONATE 0.5 MG/G
CREAM TOPICAL
Qty: 45 G | Refills: 2 | Status: CANCELLED | OUTPATIENT
Start: 2024-04-16

## 2024-04-16 NOTE — TELEPHONE ENCOUNTER
Last Appt:  4/19/2022  Next Appt:   6/4/2024  Med verified in Epic    Please refill prescription for patient. She is currently out.

## 2024-06-04 ENCOUNTER — OFFICE VISIT (OUTPATIENT)
Dept: OBGYN | Age: 84
End: 2024-06-04
Payer: MEDICARE

## 2024-06-04 VITALS
RESPIRATION RATE: 14 BRPM | DIASTOLIC BLOOD PRESSURE: 64 MMHG | OXYGEN SATURATION: 96 % | SYSTOLIC BLOOD PRESSURE: 124 MMHG | HEART RATE: 83 BPM | BODY MASS INDEX: 23.6 KG/M2 | WEIGHT: 138.2 LBS | HEIGHT: 64 IN

## 2024-06-04 DIAGNOSIS — L90.0 LICHEN SCLEROSUS ET ATROPHICUS: Primary | ICD-10-CM

## 2024-06-04 PROCEDURE — 1090F PRES/ABSN URINE INCON ASSESS: CPT | Performed by: OBSTETRICS & GYNECOLOGY

## 2024-06-04 PROCEDURE — 99213 OFFICE O/P EST LOW 20 MIN: CPT | Performed by: OBSTETRICS & GYNECOLOGY

## 2024-06-04 PROCEDURE — 1123F ACP DISCUSS/DSCN MKR DOCD: CPT | Performed by: OBSTETRICS & GYNECOLOGY

## 2024-06-04 PROCEDURE — 1036F TOBACCO NON-USER: CPT | Performed by: OBSTETRICS & GYNECOLOGY

## 2024-06-04 PROCEDURE — 99214 OFFICE O/P EST MOD 30 MIN: CPT | Performed by: OBSTETRICS & GYNECOLOGY

## 2024-06-04 PROCEDURE — G8420 CALC BMI NORM PARAMETERS: HCPCS | Performed by: OBSTETRICS & GYNECOLOGY

## 2024-06-04 PROCEDURE — G8400 PT W/DXA NO RESULTS DOC: HCPCS | Performed by: OBSTETRICS & GYNECOLOGY

## 2024-06-04 PROCEDURE — G8427 DOCREV CUR MEDS BY ELIG CLIN: HCPCS | Performed by: OBSTETRICS & GYNECOLOGY

## 2024-06-04 RX ORDER — LANCETS 28 GAUGE
EACH MISCELLANEOUS
COMMUNITY
Start: 2024-03-20

## 2024-06-04 RX ORDER — CLOBETASOL PROPIONATE 0.5 MG/G
CREAM TOPICAL
Qty: 45 G | Refills: 2 | Status: SHIPPED | OUTPATIENT
Start: 2024-06-04

## 2024-06-04 RX ORDER — FLUTICASONE FUROATE AND VILANTEROL 100; 25 UG/1; UG/1
1 POWDER RESPIRATORY (INHALATION) DAILY
COMMUNITY
Start: 2023-12-13

## 2024-06-04 RX ORDER — LATANOPROST 50 UG/ML
SOLUTION/ DROPS OPHTHALMIC
COMMUNITY
Start: 2024-05-10

## 2024-06-04 RX ORDER — FLUTICASONE PROPIONATE 220 UG/1
AEROSOL, METERED RESPIRATORY (INHALATION)
COMMUNITY
Start: 2024-04-05

## 2024-06-04 RX ORDER — AMLODIPINE BESYLATE 10 MG/1
TABLET ORAL
COMMUNITY
Start: 2024-05-06

## 2024-06-04 SDOH — ECONOMIC STABILITY: FOOD INSECURITY: WITHIN THE PAST 12 MONTHS, YOU WORRIED THAT YOUR FOOD WOULD RUN OUT BEFORE YOU GOT MONEY TO BUY MORE.: NEVER TRUE

## 2024-06-04 SDOH — ECONOMIC STABILITY: FOOD INSECURITY: WITHIN THE PAST 12 MONTHS, THE FOOD YOU BOUGHT JUST DIDN'T LAST AND YOU DIDN'T HAVE MONEY TO GET MORE.: NEVER TRUE

## 2024-06-04 SDOH — ECONOMIC STABILITY: HOUSING INSECURITY
IN THE LAST 12 MONTHS, WAS THERE A TIME WHEN YOU DID NOT HAVE A STEADY PLACE TO SLEEP OR SLEPT IN A SHELTER (INCLUDING NOW)?: NO

## 2024-06-04 SDOH — ECONOMIC STABILITY: INCOME INSECURITY: HOW HARD IS IT FOR YOU TO PAY FOR THE VERY BASICS LIKE FOOD, HOUSING, MEDICAL CARE, AND HEATING?: NOT HARD AT ALL

## 2024-06-04 ASSESSMENT — PATIENT HEALTH QUESTIONNAIRE - PHQ9
SUM OF ALL RESPONSES TO PHQ QUESTIONS 1-9: 0
1. LITTLE INTEREST OR PLEASURE IN DOING THINGS: NOT AT ALL
SUM OF ALL RESPONSES TO PHQ9 QUESTIONS 1 & 2: 0
SUM OF ALL RESPONSES TO PHQ QUESTIONS 1-9: 0
2. FEELING DOWN, DEPRESSED OR HOPELESS: NOT AT ALL
SUM OF ALL RESPONSES TO PHQ QUESTIONS 1-9: 0
SUM OF ALL RESPONSES TO PHQ QUESTIONS 1-9: 0

## 2024-06-04 NOTE — PROGRESS NOTES
Arlene Slaughter  2024      Arlene Slaughter is a 83 y.o. female       The patient was seen today. She was here to follow-up regarding her labs and diagnostics ordered at her last visit for the diagnosis of:    ICD-10-CM    1. Lichen sclerosus et atrophicus  L90.0 clobetasol (TEMOVATE) 0.05 % cream          She does not have any specific chief complaint today. Her bowels are regular and she is voiding without difficulty.  eis requesting refill on her clobetasol which she uses for a confirmed DX of Lichen sclerosus confirmed by BX in . No other cc      Past Medical History:   Diagnosis Date    Arthritis     Asthma     Atypical squamous cell of undetermined significance of cervix 10/04/2016    Negative HPV DNA    Cancer ([facility]) 2017    skin    High blood pressure     High blood sugar     Frequent, watches her diet.    Lichen sclerosus et atrophicus     Lichen sclerosus et atrophicus     Confirmed by vulvar bx     Osteopenia 2019    Urinary tract infection, recurrent     History of.         Past Surgical History:   Procedure Laterality Date    ABDOMINAL EXPLORATION SURGERY  2007    Bowel obstruction.    APPENDECTOMY      Age 19.    COLONOSCOPY      COLONOSCOPY  2014    Due in 5-7 years    CYST REMOVAL  2017    on spine    HIP FRACTURE SURGERY Right 2010    SKIN BIOPSY      multiple    TONSILLECTOMY AND ADENOIDECTOMY      TOTAL HIP ARTHROPLASTY Right 2011    TOTAL KNEE ARTHROPLASTY Left 2019    [facility]         Family History   Problem Relation Age of Onset    Diabetes Mother     Heart Disease Mother     Alzheimer's Disease Father     Diabetes Sister     Stroke Sister     Stroke Maternal Grandmother     Heart Disease Maternal Grandmother     Stroke Sister          Social History     Tobacco Use    Smoking status: Former     Types: Cigarettes    Smokeless tobacco: Never   Vaping Use    Vaping Use: Never used   Substance Use Topics    Alcohol use: Yes     Comment: Rarely     Drug use: